# Patient Record
Sex: FEMALE | Race: BLACK OR AFRICAN AMERICAN | NOT HISPANIC OR LATINO | URBAN - METROPOLITAN AREA
[De-identification: names, ages, dates, MRNs, and addresses within clinical notes are randomized per-mention and may not be internally consistent; named-entity substitution may affect disease eponyms.]

---

## 2021-06-15 ENCOUNTER — INPATIENT (INPATIENT)
Facility: HOSPITAL | Age: 21
LOS: 2 days | Discharge: HOME | End: 2021-06-18
Attending: PSYCHIATRY & NEUROLOGY | Admitting: PSYCHIATRY & NEUROLOGY
Payer: COMMERCIAL

## 2021-06-15 ENCOUNTER — EMERGENCY (EMERGENCY)
Facility: HOSPITAL | Age: 21
LOS: 1 days | End: 2021-06-15
Attending: EMERGENCY MEDICINE
Payer: COMMERCIAL

## 2021-06-15 VITALS
DIASTOLIC BLOOD PRESSURE: 78 MMHG | HEIGHT: 64 IN | RESPIRATION RATE: 16 BRPM | HEART RATE: 67 BPM | WEIGHT: 139.99 LBS | OXYGEN SATURATION: 98 % | TEMPERATURE: 97 F | SYSTOLIC BLOOD PRESSURE: 110 MMHG

## 2021-06-15 VITALS — WEIGHT: 132.06 LBS | HEIGHT: 64 IN | TEMPERATURE: 98 F | RESPIRATION RATE: 19 BRPM

## 2021-06-15 VITALS
HEART RATE: 89 BPM | RESPIRATION RATE: 18 BRPM | SYSTOLIC BLOOD PRESSURE: 106 MMHG | DIASTOLIC BLOOD PRESSURE: 67 MMHG | TEMPERATURE: 98 F | OXYGEN SATURATION: 96 %

## 2021-06-15 DIAGNOSIS — F43.20 ADJUSTMENT DISORDER, UNSPECIFIED: ICD-10-CM

## 2021-06-15 DIAGNOSIS — F32.9 MAJOR DEPRESSIVE DISORDER, SINGLE EPISODE, UNSPECIFIED: ICD-10-CM

## 2021-06-15 LAB
ALBUMIN SERPL ELPH-MCNC: 5 G/DL — SIGNIFICANT CHANGE UP (ref 3.3–5)
ALP SERPL-CCNC: 68 U/L — SIGNIFICANT CHANGE UP (ref 40–120)
ALT FLD-CCNC: 9 U/L — LOW (ref 10–45)
AMPHET UR-MCNC: NEGATIVE — SIGNIFICANT CHANGE UP
ANION GAP SERPL CALC-SCNC: 16 MMOL/L — SIGNIFICANT CHANGE UP (ref 5–17)
APAP SERPL-MCNC: <15 UG/ML — SIGNIFICANT CHANGE UP (ref 10–30)
APPEARANCE UR: CLEAR — SIGNIFICANT CHANGE UP
AST SERPL-CCNC: 13 U/L — SIGNIFICANT CHANGE UP (ref 10–40)
BACTERIA # UR AUTO: ABNORMAL
BARBITURATES UR SCN-MCNC: NEGATIVE — SIGNIFICANT CHANGE UP
BASOPHILS # BLD AUTO: 0.03 K/UL — SIGNIFICANT CHANGE UP (ref 0–0.2)
BASOPHILS NFR BLD AUTO: 0.3 % — SIGNIFICANT CHANGE UP (ref 0–2)
BENZODIAZ UR-MCNC: NEGATIVE — SIGNIFICANT CHANGE UP
BILIRUB SERPL-MCNC: 0.2 MG/DL — SIGNIFICANT CHANGE UP (ref 0.2–1.2)
BILIRUB UR-MCNC: NEGATIVE — SIGNIFICANT CHANGE UP
BUN SERPL-MCNC: 7 MG/DL — SIGNIFICANT CHANGE UP (ref 7–23)
CALCIUM SERPL-MCNC: 10.4 MG/DL — SIGNIFICANT CHANGE UP (ref 8.4–10.5)
CHLORIDE SERPL-SCNC: 99 MMOL/L — SIGNIFICANT CHANGE UP (ref 96–108)
CO2 SERPL-SCNC: 23 MMOL/L — SIGNIFICANT CHANGE UP (ref 22–31)
COCAINE METAB.OTHER UR-MCNC: NEGATIVE — SIGNIFICANT CHANGE UP
COLOR SPEC: SIGNIFICANT CHANGE UP
COVID-19 SPIKE DOMAIN AB INTERP: POSITIVE
COVID-19 SPIKE DOMAIN ANTIBODY RESULT: >250 U/ML — HIGH
CREAT SERPL-MCNC: 0.86 MG/DL — SIGNIFICANT CHANGE UP (ref 0.5–1.3)
DIFF PNL FLD: NEGATIVE — SIGNIFICANT CHANGE UP
EOSINOPHIL # BLD AUTO: 0.04 K/UL — SIGNIFICANT CHANGE UP (ref 0–0.5)
EOSINOPHIL NFR BLD AUTO: 0.4 % — SIGNIFICANT CHANGE UP (ref 0–6)
EPI CELLS # UR: 6 /HPF — HIGH
ETHANOL SERPL-MCNC: SIGNIFICANT CHANGE UP MG/DL (ref 0–10)
GLUCOSE SERPL-MCNC: 109 MG/DL — HIGH (ref 70–99)
GLUCOSE UR QL: NEGATIVE — SIGNIFICANT CHANGE UP
HCG SERPL-ACNC: <2 MIU/ML — SIGNIFICANT CHANGE UP
HCG UR QL: NEGATIVE — SIGNIFICANT CHANGE UP
HCT VFR BLD CALC: 37.3 % — SIGNIFICANT CHANGE UP (ref 34.5–45)
HGB BLD-MCNC: 10.9 G/DL — LOW (ref 11.5–15.5)
HYALINE CASTS # UR AUTO: 4 /LPF — HIGH (ref 0–2)
IMM GRANULOCYTES NFR BLD AUTO: 0.3 % — SIGNIFICANT CHANGE UP (ref 0–1.5)
KETONES UR-MCNC: ABNORMAL
LEUKOCYTE ESTERASE UR-ACNC: ABNORMAL
LYMPHOCYTES # BLD AUTO: 1.69 K/UL — SIGNIFICANT CHANGE UP (ref 1–3.3)
LYMPHOCYTES # BLD AUTO: 18.3 % — SIGNIFICANT CHANGE UP (ref 13–44)
MCHC RBC-ENTMCNC: 23.3 PG — LOW (ref 27–34)
MCHC RBC-ENTMCNC: 29.2 GM/DL — LOW (ref 32–36)
MCV RBC AUTO: 79.7 FL — LOW (ref 80–100)
METHADONE UR-MCNC: NEGATIVE — SIGNIFICANT CHANGE UP
MONOCYTES # BLD AUTO: 0.9 K/UL — SIGNIFICANT CHANGE UP (ref 0–0.9)
MONOCYTES NFR BLD AUTO: 9.8 % — SIGNIFICANT CHANGE UP (ref 2–14)
NEUTROPHILS # BLD AUTO: 6.53 K/UL — SIGNIFICANT CHANGE UP (ref 1.8–7.4)
NEUTROPHILS NFR BLD AUTO: 70.9 % — SIGNIFICANT CHANGE UP (ref 43–77)
NITRITE UR-MCNC: NEGATIVE — SIGNIFICANT CHANGE UP
NRBC # BLD: 0 /100 WBCS — SIGNIFICANT CHANGE UP (ref 0–0)
OPIATES UR-MCNC: NEGATIVE — SIGNIFICANT CHANGE UP
OXYCODONE UR-MCNC: NEGATIVE — SIGNIFICANT CHANGE UP
PCP SPEC-MCNC: SIGNIFICANT CHANGE UP
PCP UR-MCNC: NEGATIVE — SIGNIFICANT CHANGE UP
PH UR: 6 — SIGNIFICANT CHANGE UP (ref 5–8)
PLATELET # BLD AUTO: 366 K/UL — SIGNIFICANT CHANGE UP (ref 150–400)
POTASSIUM SERPL-MCNC: 4 MMOL/L — SIGNIFICANT CHANGE UP (ref 3.5–5.3)
POTASSIUM SERPL-SCNC: 4 MMOL/L — SIGNIFICANT CHANGE UP (ref 3.5–5.3)
PROT SERPL-MCNC: 8.3 G/DL — SIGNIFICANT CHANGE UP (ref 6–8.3)
PROT UR-MCNC: ABNORMAL
RBC # BLD: 4.68 M/UL — SIGNIFICANT CHANGE UP (ref 3.8–5.2)
RBC # FLD: 16.1 % — HIGH (ref 10.3–14.5)
RBC CASTS # UR COMP ASSIST: 3 /HPF — SIGNIFICANT CHANGE UP (ref 0–4)
SALICYLATES SERPL-MCNC: <2 MG/DL — LOW (ref 15–30)
SARS-COV-2 IGG+IGM SERPL QL IA: >250 U/ML — HIGH
SARS-COV-2 IGG+IGM SERPL QL IA: POSITIVE
SARS-COV-2 RNA SPEC QL NAA+PROBE: SIGNIFICANT CHANGE UP
SODIUM SERPL-SCNC: 138 MMOL/L — SIGNIFICANT CHANGE UP (ref 135–145)
SP GR SPEC: 1.02 — SIGNIFICANT CHANGE UP (ref 1.01–1.02)
THC UR QL: NEGATIVE — SIGNIFICANT CHANGE UP
TSH SERPL-MCNC: 2.05 UIU/ML — SIGNIFICANT CHANGE UP (ref 0.27–4.2)
UROBILINOGEN FLD QL: NEGATIVE — SIGNIFICANT CHANGE UP
WBC # BLD: 9.22 K/UL — SIGNIFICANT CHANGE UP (ref 3.8–10.5)
WBC # FLD AUTO: 9.22 K/UL — SIGNIFICANT CHANGE UP (ref 3.8–10.5)
WBC UR QL: 11 /HPF — HIGH (ref 0–5)

## 2021-06-15 PROCEDURE — 81001 URINALYSIS AUTO W/SCOPE: CPT

## 2021-06-15 PROCEDURE — 80053 COMPREHEN METABOLIC PANEL: CPT

## 2021-06-15 PROCEDURE — 99284 EMERGENCY DEPT VISIT MOD MDM: CPT

## 2021-06-15 PROCEDURE — 85025 COMPLETE CBC W/AUTO DIFF WBC: CPT

## 2021-06-15 PROCEDURE — 99285 EMERGENCY DEPT VISIT HI MDM: CPT

## 2021-06-15 PROCEDURE — 81025 URINE PREGNANCY TEST: CPT

## 2021-06-15 PROCEDURE — 90792 PSYCH DIAG EVAL W/MED SRVCS: CPT | Mod: 95

## 2021-06-15 PROCEDURE — 84702 CHORIONIC GONADOTROPIN TEST: CPT

## 2021-06-15 PROCEDURE — 93005 ELECTROCARDIOGRAM TRACING: CPT

## 2021-06-15 PROCEDURE — 80307 DRUG TEST PRSMV CHEM ANLYZR: CPT

## 2021-06-15 PROCEDURE — 86769 SARS-COV-2 COVID-19 ANTIBODY: CPT

## 2021-06-15 PROCEDURE — 93010 ELECTROCARDIOGRAM REPORT: CPT

## 2021-06-15 PROCEDURE — 87635 SARS-COV-2 COVID-19 AMP PRB: CPT

## 2021-06-15 PROCEDURE — 99221 1ST HOSP IP/OBS SF/LOW 40: CPT

## 2021-06-15 PROCEDURE — 84443 ASSAY THYROID STIM HORMONE: CPT

## 2021-06-15 RX ORDER — EPINEPHRINE 0.3 MG/.3ML
0.3 INJECTION INTRAMUSCULAR; SUBCUTANEOUS ONCE
Refills: 0 | Status: DISCONTINUED | OUTPATIENT
Start: 2021-06-15 | End: 2021-06-18

## 2021-06-15 NOTE — BH INPATIENT PSYCHIATRY ASSESSMENT NOTE - HPI (INCLUDE ILLNESS QUALITY, SEVERITY, DURATION, TIMING, CONTEXT, MODIFYING FACTORS, ASSOCIATED SIGNS AND SYMPTOMS)
20F. living with family, recently graduated from [employer], no known medical conditions, no known prior psychiatric history, no prior suicide attempts or hospitalizations, now BIBEMS after she attempted to kill herself by ingesting a handful of OTC melatonin pills at 5pm 6/14 in an attempt to kill herself after finding out that her boyfriend cheated.   20F. living with family, recently graduated from [employer], no known medical conditions, no known prior psychiatric history, social ETOH with some recent  increase in use, no withdrawal, last use several days ago, no prior suicide attempts or hospitalizations, now BIBEMS after she attempted to kill herself by ingesting a handful of OTC melatonin pills at 5pm 6/14 in an attempt to kill herself after finding out about an infidelity in a relationship, presented to [facility], was seen by medical provider who cleared patient, also by telepsychiatry, pt continued to endorse  suicidal ideations, was admitted on 9.27.

## 2021-06-15 NOTE — ED ADULT NURSE REASSESSMENT NOTE - NS ED NURSE REASSESS COMMENT FT1
Pt has been sleeping at intervals, calm and controlled, refused breakfast, ambulated to bathroom, CO in place, VSS; collaboration ongoing with  for transfer to [facility]; continue to monitor.

## 2021-06-15 NOTE — BH INPATIENT PSYCHIATRY ASSESSMENT NOTE - NSBHASSESSSUMMFT_PSY_ALL_CORE
20 year-old with no past psychiatric history with what sounds like impulsive ingestion without clear suicidal intent, took melatonin, expected to "sleep" rather than die.   Will routine checks  Defer meds to inpatient team  No chronic medical issues

## 2021-06-15 NOTE — ED PROVIDER NOTE - PROGRESS NOTE DETAILS
[place]: psych consulted, patient remains awake and alert Attending note (Luis Antonio): per d/w tele psychiatry attending, recommending 2 PC for involuntary psychiatric admission for further evaluation and stabilization.  2PC paperwork filled out with Dr Mak (at time of sign-out).  Patient remains calm, cooperative and resting.  Awaiting bed assignment. received sign out from Dr Salmon pending psych placement. briefly, young female with no sig pmh now a SI and concern for safety needing 2PC. signed forms. pt is sleeping comfortably now. DJ Abimael Parson, PGY 3: Received sign out on patient. pending psych day team eval. for beds Abimale Parson, PGY 3: patient is medically cleared Abimael Parson, PGY 3:

## 2021-06-15 NOTE — BH PATIENT PROFILE - HOME MEDICATIONS
Adrenaclick Two-Pack 0.3 mg injectable kit , 0.3 milligram(s) injectable prn for anaphylaxis/allergic reaction  Benadryl ,  orally

## 2021-06-15 NOTE — ED ADULT NURSE REASSESSMENT NOTE - NS ED NURSE REASSESS COMMENT FT1
As per Telepsych reassessment, pt has the mental capacity to sign out AMA. pt. was told earlier that she needs to be admitted to medicine for management of her anemia, but she refused to be treated for it and she signed her dischage papers w/ AMA.

## 2021-06-15 NOTE — ED BEHAVIORAL HEALTH ASSESSMENT NOTE - RISK ASSESSMENT
risk factors include recent suicide attempt, irritability, recent humiliation, stress from work, lack of outpatient treatment. Protective factors include lack of psychosis High Acute Suicide Risk

## 2021-06-15 NOTE — ED BEHAVIORAL HEALTH ASSESSMENT NOTE - SUMMARY
20F. living with family, recently graduated from [employer], no known medical conditions, no known prior psychiatric history, no prior suicide attempts or hospitalizations, now BIBEMS after she attempted to kill herself by ingesting a handful of OTC melatonin pills at 5pm 6/14 in an attempt to kill herself after finding out that her boyfriend cheated.    While patient is currently irritable and not wanting to engage much in interview, she remains at high risk of suicide given recent attempt and ongoing suicidality, with poor insight and judgment requesting discharge. As such she will require involuntary hospitalization for safety, stabilization, and possible medication titration.    COVID Exposure Screen- Patient  1.	*Have you had a COVID-19 test in the last 90 days?  (  ) Yes   (  X) No   (  ) Unknown- Reason: _____  IF YES PROCEED TO QUESTION #2. IF NO OR UNKNOWN, PLEASE SKIP TO QUESTION #3.  2.	Date of test(s) and result(s): ________  3.	*Have you tested positive for COVID-19 antibodies? (  ) Yes   (  X) No   (  ) Unknown- Reason: _____  IF YES PROCEED TO QUESTION #4. IF NO or UNKNOWN, PLEASE SKIP TO QUESTION #5.  4.	Date of positive antibody test: ________  5.	*Have you received 2 doses of the COVID-19 vaccine? ( X ) Yes   (  ) No   (  ) Unknown- Reason: _____   IF YES PROCEED TO QUESTION #6. IF NO or UNKNOWN, PLEASE SKIP TO QUESTION #7.  6.	Date of second dose: __moderna second dose May 12  7.	*In the past 10 days, have you been around anyone with a positive COVID-19 test?* (  ) Yes   (X  ) No   (  ) Unknown- Reason: ____  IF YES PROCEED TO QUESTION #8. IF NO or UNKNOWN, PLEASE SKIP TO QUESTION #13.  8.	Were you within 6 feet of them for at least 15 minutes? (  ) Yes   (  ) No   (  ) Unknown- Reason: _____  9.	Have you provided care for them? (  ) Yes   (  ) No   (  ) Unknown- Reason: ______  10.	Have you had direct physical contact with them (touched, hugged, or kissed them)? (  ) Yes   (  ) No    (  ) Unknown- Reason: _____  11.	Have you shared eating or drinking utensils with them? (  ) Yes   (  ) No    (  ) Unknown- Reason: ____  12.	Have they sneezed, coughed, or somehow gotten respiratory droplets on you? (  ) Yes   (  ) No    (  ) Unknown- Reason: ______  13.	*Have you been out of [place] within the past 10 days?* (  ) Yes   ( X ) No   (  ) Unknown- Reason: _____  IF YES PLEASE ANSWER THE FOLLOWING QUESTIONS:  14.	Which state/country have you been to? ______  15.	Were you there over 24 hours? (  ) Yes   (  ) No    (  ) Unknown- Reason: ______  16.	Date of return to [facility]: ______

## 2021-06-15 NOTE — ED BEHAVIORAL HEALTH ASSESSMENT NOTE - HPI (INCLUDE ILLNESS QUALITY, SEVERITY, DURATION, TIMING, CONTEXT, MODIFYING FACTORS, ASSOCIATED SIGNS AND SYMPTOMS)
20F. living with family, recently graduated from [employer], no known medical conditions, no known prior psychiatric history, no prior suicide attempts or hospitalizations, now BIBEMS after she attempted to kill herself by ingesting a handful of OTC melatonin pills at 5pm 6/14 in an attempt to kill herself after finding out that her boyfriend cheated.    History limited by patient effort as she stated that she did not want to talk about what had happened, just wanted to go home. However she stated that she tried to kill herself overnight by overdose, and still wants to kill herself. She denied that she takes any medications, denies allergies to any medications, did not answer questions related to etoh and cannabis use or other questions about psychosis or xavi.      Per ED chart, this happened in the context of finding out her boyfriend cheated on her this past sunday. 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

## 2021-06-15 NOTE — ED BEHAVIORAL HEALTH NOTE - BEHAVIORAL HEALTH NOTE
===================  PRE-HOSPITAL COURSE  ===================  SOURCE: Chart    DETAILS: Patient arrived via EMS after suicide attempt by overdose on melatonin    ============  ED COURSE   ============  SOURCE: Chart, ED    ARRIVAL: Patient arrived via EMS    BELONGINGS: No items of note    BEHAVIOR: Patient arrived to the ED alert and oriented x3, patient was cooperative with ED medical and safety protocols. Patient reported depressed mood due to recent break-up, states attempted suicide prior to arrival by overdose on melatonin and still having SI, no HI, not overtly psychotic/manic, thought process/speech WNL, patient remained in good behavioral control prior to assessment.     TREATMENT: No PRN psychiatric medication prior to assessment     VISITORS: None     ========================  COLLATERAL  ========================    Patient didn’t provide collateral at this time    COVID Exposure Screen- collateral (i.e. third-party, chart review, belongings, etc; include EMS and ED staff)  1.	*Has the patient had a COVID-19 test in the last 90 days?  (  ) Yes   (  ) No   ( x ) Unknown- Reason: _____  IF YES PROCEED TO QUESTION #2. IF NO OR UNKNOWN, PLEASE SKIP TO QUESTION #3.  2.	Date of test(s) and result(s): ________  3.	*Has the patient tested positive for COVID-19 antibodies? (  ) Yes   (  ) No   ( x ) Unknown- Reason: _____  IF YES PROCEED TO QUESTION #4. IF NO or UNKNOWN, PLEASE SKIP TO QUESTION #5.  4.	Date of positive antibody test: ________  5.	*Has the patient received 2 doses of the COVID-19 vaccine? (  ) Yes   (  ) No   ( x ) Unknown- Reason: _____  IF YES PROCEED TO QUESTION #6. IF NO or UNKNOWN, PLEASE SKIP TO QUESTION #7.  6.	 Date of second dose: ________  7.	*In the past 10 days, has the patient been around anyone with a positive COVID-19 test?* (  ) Yes   (  ) No   ( x ) Unknown- Reason: __  IF YES PROCEED TO QUESTION #8. IF NO or UNKNOWN, PLEASE SKIP TO QUESTION #13.  8.	Was the patient within 6 feet of them for at least 15 minutes? (  ) Yes   (  ) No   (  ) Unknown- Reason: _____  9.	Did the patient provide care for them? (  ) Yes   (  ) No   (  ) Unknown- Reason: ______  10.	Did the patient have direct physical contact with them (touched, hugged, or kissed them)? (  ) Yes   (  ) No    (  ) Unknown- Reason: __  11.	Did the patient share eating or drinking utensils with them? (  ) Yes   (  ) No    (  ) Unknown- Reason: ____  12.	Did they sneeze, cough, or somehow get respiratory droplets on the patient? (  ) Yes   (  ) No    (  ) Unknown- Reason: ______  13.	*Has the patient been out of [place] within the past 10 days?* (  ) Yes   (  ) No   ( x ) Unknown- Reason: _____  IF YES PLEASE ANSWER THE FOLLOWING QUESTIONS:  14.	Which state/country did they go to? ______  15.	Were they there over 24 hours? (  ) Yes   (  ) No    (  ) Unknown- Reason: ______  16.	Date of return to [facility]: ______

## 2021-06-15 NOTE — CHART NOTE - NSCHARTNOTEFT_GEN_A_CORE
ED SW: Chart reviewed for psychiatric transfer. Patient presented via EMS after SA via overdose on melatonin. Patient medically cleared for psychiatric evaluation. Patient evaluated by Telepsych and recommended for inpatient psych hospitalization pending bed availability. 2PC placed in chart.    LCSW contacted [facility] for bed availability. [facility] for medical clearance requested and completed. EKG and 2PC faxed to [facility] as part of review process. Patient accepted and assigned to [address] with Dr. Kristopher Ruiz. MD informed and completed transfer disposition. ROSARIO RN informed of all transfer details. ROSARIO RN to provide handoff to [address] and will contact [facility] to arrange BLS transport. Transfer packet completed to travel with patient.

## 2021-06-15 NOTE — ED PROVIDER NOTE - ATTENDING CONTRIBUTION TO CARE
Attending note (Luis Antonio): 21y/o f presenting s/p reported overdose with "handful" of melatonin tablets; appears depressed; concern for persistent SI following attempt; though unlikely amount and type of medication (melatonin) will create any significant toxic affect; not displaying any evidence of toxidrome, and is otherwise not in acute distress (though depressed affect).  ECG shows borderline tachycardia (102) with normal intervals. will obtain screening labs: cbc (to evaluate for leukocytosis or anemia), CMP (to evaluate for electrolyte abnormalities or renal/liver dysfunction), hcg, serum/urine tox screening (to check for co-ingestion).  Pending initial medical evaluation, will then consult psychiatry service.

## 2021-06-15 NOTE — ED BEHAVIORAL HEALTH ASSESSMENT NOTE - DETAILS
as above Would not impact clinical decisionmaking at this time Discussed with ED attending no contact information availabl

## 2021-06-15 NOTE — ED ADULT NURSE REASSESSMENT NOTE - NS ED NURSE REASSESS COMMENT FT1
Pt still awaiting inpatient transfer, CO in place, VSS; woke up and was somewhat more verbal but still expressing frustration with being in hospital, she responded very well to empathetic support and writer providing her with reading materials and word games since she had expressed boredom; continue to monitor.

## 2021-06-15 NOTE — ED PROVIDER NOTE - NS ED ROS FT
Review of Systems:  -General: no fever or chills  -ENT: no congestion, no difficulty swallowing  -Pulmonary: no cough, no shortness of breath  -Cardiac: no chest pain, no palpitations  -Gastrointestinal: no abdominal pain, no nausea, no vomiting, and no diarrhea.  -Genitourinary: no blood or pain with urination  -Musculoskeletal: no back or neck pain  -Skin: no rashes  -Endocrine: No h/o diabetes or thyroid disease  -Neurologic: No focal weakness or numbness  -Psych: +depression, +SI    All else negative unless otherwise specified elsewhere in this note.

## 2021-06-15 NOTE — ED ADULT NURSE NOTE - OBJECTIVE STATEMENT
19 y/o female bib ems s/p ingestion of unknown amount of melatonin since her boyfriend broke-up w/ her, pt. 21 y/o female bib ems s/p ingestion of unknown amount of melatonin since her boyfriend broke-up w/ her, pt. took melatonin to sleep forever and forget what really happened between her and her boyfriend. pt. denies any prior suicide attempts, no hx of inpt tx, not on any anti-depressants or psychotropics. denies any a/v hallucinations or delusions of any kind. pt. is cooperative w/ ED and security protocol. placed on 1:1 CO for s/i.

## 2021-06-15 NOTE — ED ADULT NURSE REASSESSMENT NOTE - NS ED NURSE REASSESS COMMENT FT1
pt. was made aware of the plan for admission to a psych facility as per Telepsych since she a high risk for suicide. 1:1 CO for s/i maintained.
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
## 2021-06-15 NOTE — ED PROVIDER NOTE - PHYSICAL EXAMINATION
On Physical Exam:  General: awake/alert, speaking clearly in full sentences and without difficulty; cooperative with exam  HEENT: PERRL, MMM, airway patent  Neck: no neck tenderness, no nuchal rigidity  Cardiac: normal s1, s2; RRR; no MGR  Lungs: CTABL  Abdomen: soft nontender/nondistended  : no bladder tenderness or distension  Skin: intact, no rash  Extremities: no peripheral edema, no gross deformities  Neuro: no gross neurologic deficits; no facial asymmetry, moving all extremities, normal gait, no rigidity/clonus, no tremors (resting or intention)  Psych: flat affect, depressed appearing; endorsing continued desire to end her life

## 2021-06-16 LAB
APPEARANCE UR: CLEAR — SIGNIFICANT CHANGE UP
BILIRUB UR-MCNC: NEGATIVE — SIGNIFICANT CHANGE UP
COLOR SPEC: YELLOW — SIGNIFICANT CHANGE UP
COVID-19 SPIKE DOMAIN AB INTERP: POSITIVE
COVID-19 SPIKE DOMAIN ANTIBODY RESULT: >250 U/ML — HIGH
DIFF PNL FLD: NEGATIVE — SIGNIFICANT CHANGE UP
GLUCOSE UR QL: NEGATIVE — SIGNIFICANT CHANGE UP
HCG UR QL: NEGATIVE — SIGNIFICANT CHANGE UP
KETONES UR-MCNC: NEGATIVE — SIGNIFICANT CHANGE UP
LEUKOCYTE ESTERASE UR-ACNC: ABNORMAL
NITRITE UR-MCNC: NEGATIVE — SIGNIFICANT CHANGE UP
PCP SPEC-MCNC: SIGNIFICANT CHANGE UP
PH UR: 6 — SIGNIFICANT CHANGE UP (ref 5–8)
PROT UR-MCNC: ABNORMAL
SARS-COV-2 IGG+IGM SERPL QL IA: >250 U/ML — HIGH
SARS-COV-2 IGG+IGM SERPL QL IA: POSITIVE
SP GR SPEC: 1.03 — HIGH (ref 1.01–1.02)
UROBILINOGEN FLD QL: SIGNIFICANT CHANGE UP

## 2021-06-16 PROCEDURE — 99232 SBSQ HOSP IP/OBS MODERATE 35: CPT

## 2021-06-16 PROCEDURE — 90832 PSYTX W PT 30 MINUTES: CPT

## 2021-06-16 RX ORDER — TRAZODONE HCL 50 MG
50 TABLET ORAL AT BEDTIME
Refills: 0 | Status: DISCONTINUED | OUTPATIENT
Start: 2021-06-16 | End: 2021-06-18

## 2021-06-16 RX ORDER — SERTRALINE 25 MG/1
25 TABLET, FILM COATED ORAL DAILY
Refills: 0 | Status: DISCONTINUED | OUTPATIENT
Start: 2021-06-17 | End: 2021-06-17

## 2021-06-16 RX ORDER — DIPHENHYDRAMINE HCL 50 MG
50 CAPSULE ORAL ONCE
Refills: 0 | Status: COMPLETED | OUTPATIENT
Start: 2021-06-16 | End: 2021-06-16

## 2021-06-16 RX ORDER — SERTRALINE 25 MG/1
25 TABLET, FILM COATED ORAL ONCE
Refills: 0 | Status: COMPLETED | OUTPATIENT
Start: 2021-06-16 | End: 2021-06-16

## 2021-06-16 RX ADMIN — SERTRALINE 25 MILLIGRAM(S): 25 TABLET, FILM COATED ORAL at 12:58

## 2021-06-16 RX ADMIN — Medication 50 MILLIGRAM(S): at 21:01

## 2021-06-16 RX ADMIN — Medication 50 MILLIGRAM(S): at 01:39

## 2021-06-16 NOTE — PSYCHIATRIC REHAB INITIAL EVALUATION - NSBHEDULEVEL_PSY_ALL_CORE
Pt recently graduated from [employer]" with a degree in communication disorders./Bachelor's Degree

## 2021-06-16 NOTE — PSYCHIATRIC REHAB INITIAL EVALUATION - NSBHALCSUBCHOICE_PSY_ALL_CORE
Past social use of marijuana. Pt reports social use of alcohol, however over last few months has been drinking increased amounts of alcohol due to stress of relationship and school. Pt reports drinking a large cup of mixed drink every couple days.

## 2021-06-16 NOTE — BH SOCIAL WORK INITIAL PSYCHOSOCIAL EVALUATION - NSCMSPTSTRENGTHS_PSY_ALL_CORE
Expressive of emotions/Future/goal oriented/Highly motivated for treatment/Intact employment/Intelligence/Interpersonal skills/Physically healthy/Positive attitude/Successful coping history

## 2021-06-16 NOTE — BH TREATMENT PLAN - NSCMSPTSTRENGTHS_PSY_ALL_CORE
Expressive of emotions/Financial stability/Intact employment/Intelligence/Interpersonal skills/Physically healthy/Strong support system

## 2021-06-16 NOTE — PSYCHIATRIC REHAB INITIAL EVALUATION - NSBHPRRECOMMEND_PSY_ALL_CORE
Writer, NP, psychologist and SW met with pt for initial assessment, oriented pt to daily unit activities, DBT group programming and psychiatric rehabilitation programming. Writer provided pt with a welcome packet and was encouraged to attend groups. In response to COVID-19, unit programming will be re-evaluated on a consistent basis in effort to maintain safety guidelines. Pt was polite, verbal and cooperative during initial assessment. Pt was forthcoming with personal data and information surrounding admitting circumstances. Pt was admitted after OD on melatonin after finding out infidelity with ex-boyfriend. Pt denies this OD as a SA but rather wanted to sleep, however acknowledges there may have been an underlying intent to harm herself. Pt reported since discovering this information endorsing worsening depression and anxiety leading her to reach out to suicide hotline. Hotline provided her with outpatient resources, however each place she contacted had no availability. Pt was able to build rapport with writer and able to identify psychiatric rehabilitation goal to address during current hospitalization. Psychiatric rehabilitation staff will continue to engage patient daily in order to develop therapeutic rapport. Pt’s insight and judgement are fair. Pt denies SI, HI, AH and VH.
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
## 2021-06-17 LAB
A1C WITH ESTIMATED AVERAGE GLUCOSE RESULT: 5.1 % — SIGNIFICANT CHANGE UP (ref 4–5.6)
ALBUMIN SERPL ELPH-MCNC: 4.5 G/DL — SIGNIFICANT CHANGE UP (ref 3.3–5)
ALP SERPL-CCNC: 64 U/L — SIGNIFICANT CHANGE UP (ref 40–120)
ALT FLD-CCNC: 9 U/L — SIGNIFICANT CHANGE UP (ref 4–33)
ANION GAP SERPL CALC-SCNC: 15 MMOL/L — HIGH (ref 7–14)
AST SERPL-CCNC: 17 U/L — SIGNIFICANT CHANGE UP (ref 4–32)
BILIRUB SERPL-MCNC: 0.2 MG/DL — SIGNIFICANT CHANGE UP (ref 0.2–1.2)
BUN SERPL-MCNC: 9 MG/DL — SIGNIFICANT CHANGE UP (ref 7–23)
CALCIUM SERPL-MCNC: 9.8 MG/DL — SIGNIFICANT CHANGE UP (ref 8.4–10.5)
CHLORIDE SERPL-SCNC: 100 MMOL/L — SIGNIFICANT CHANGE UP (ref 98–107)
CHOLEST SERPL-MCNC: 137 MG/DL — SIGNIFICANT CHANGE UP
CO2 SERPL-SCNC: 25 MMOL/L — SIGNIFICANT CHANGE UP (ref 22–31)
CREAT SERPL-MCNC: 0.8 MG/DL — SIGNIFICANT CHANGE UP (ref 0.5–1.3)
ESTIMATED AVERAGE GLUCOSE: 100 MG/DL — SIGNIFICANT CHANGE UP (ref 68–114)
GLUCOSE SERPL-MCNC: 71 MG/DL — SIGNIFICANT CHANGE UP (ref 70–99)
HCT VFR BLD CALC: 36.4 % — SIGNIFICANT CHANGE UP (ref 34.5–45)
HDLC SERPL-MCNC: 43 MG/DL — LOW
HGB BLD-MCNC: 10.5 G/DL — LOW (ref 11.5–15.5)
LIPID PNL WITH DIRECT LDL SERPL: 81 MG/DL — SIGNIFICANT CHANGE UP
MCHC RBC-ENTMCNC: 23.2 PG — LOW (ref 27–34)
MCHC RBC-ENTMCNC: 28.8 GM/DL — LOW (ref 32–36)
MCV RBC AUTO: 80.4 FL — SIGNIFICANT CHANGE UP (ref 80–100)
NON HDL CHOLESTEROL: 94 MG/DL — SIGNIFICANT CHANGE UP
NRBC # BLD: 0 /100 WBCS — SIGNIFICANT CHANGE UP
NRBC # FLD: 0 K/UL — SIGNIFICANT CHANGE UP
PLATELET # BLD AUTO: 351 K/UL — SIGNIFICANT CHANGE UP (ref 150–400)
POTASSIUM SERPL-MCNC: 3.8 MMOL/L — SIGNIFICANT CHANGE UP (ref 3.5–5.3)
POTASSIUM SERPL-SCNC: 3.8 MMOL/L — SIGNIFICANT CHANGE UP (ref 3.5–5.3)
PROT SERPL-MCNC: 8.3 G/DL — SIGNIFICANT CHANGE UP (ref 6–8.3)
RBC # BLD: 4.53 M/UL — SIGNIFICANT CHANGE UP (ref 3.8–5.2)
RBC # FLD: 16.2 % — HIGH (ref 10.3–14.5)
SODIUM SERPL-SCNC: 140 MMOL/L — SIGNIFICANT CHANGE UP (ref 135–145)
TRIGL SERPL-MCNC: 63 MG/DL — SIGNIFICANT CHANGE UP
TSH SERPL-MCNC: 1.3 UIU/ML — SIGNIFICANT CHANGE UP (ref 0.27–4.2)
WBC # BLD: 8.13 K/UL — SIGNIFICANT CHANGE UP (ref 3.8–10.5)
WBC # FLD AUTO: 8.13 K/UL — SIGNIFICANT CHANGE UP (ref 3.8–10.5)

## 2021-06-17 PROCEDURE — 99232 SBSQ HOSP IP/OBS MODERATE 35: CPT

## 2021-06-17 RX ORDER — SERTRALINE 25 MG/1
50 TABLET, FILM COATED ORAL DAILY
Refills: 0 | Status: DISCONTINUED | OUTPATIENT
Start: 2021-06-18 | End: 2021-06-18

## 2021-06-17 RX ADMIN — Medication 50 MILLIGRAM(S): at 21:00

## 2021-06-17 RX ADMIN — SERTRALINE 25 MILLIGRAM(S): 25 TABLET, FILM COATED ORAL at 09:03

## 2021-06-17 NOTE — BH PSYCHOLOGY - GROUP THERAPY NOTE - NSBHPSYCHOLPARTICIPCOMMENT_PSY_A_CORE FT
Pt attended the DBT skills acquisition group which takes place daily and is invite only. Group began with introductions, emotions check in, and a mindfulness exercise. Today's skill focused on the mindfulness module and introduced practicing loving kindness to increase love and compassion skill. Group members engaged in discussion regarding the skill and spoke about the challenges of engaging in self-love towards themselves. The  led group members through a loving kindness meditation exercise where group members then shared their feedback. Group ended by having group members identify one act of kindness they can engage in throughout the day and encouraging group members to practice the skill outside of group.    Pt arrived 35 minutes late to group. She engaged in discussion with the skill and shared that prior to being hospitalized she would use prayer to help her, and felt more loved. She spoke how she struggles to love herself, however has many friends who she feels love her. She was engaged and cooperative and was receptive to support from .

## 2021-06-17 NOTE — BH INPATIENT PSYCHIATRY PROGRESS NOTE - NSBHFUPINTERVALHXFT_PSY_A_CORE
Patient seen for follow up for depression, chart reviewed, and case discussed with Dr. Ruiz and in tx team meeting with interdisciplinary staff.. Patient reports improved sleep and met with dietitian today re: food choices.  She reports current depressed mood is somewhat improved.  No significant events reported overnight. Rx adherent, no SE noted or reported. Denies any SI or HI.  Reports will tell staff if she has any thoughts of harming self or others.  EKG is on chart.

## 2021-06-17 NOTE — DIETITIAN INITIAL EVALUATION ADULT. - OTHER INFO
Patient admitted to [facility] d/t . Patient states she is not used to the foods served in the hospital. Food preferences obtained and implemented. Reports she has been eating more salads and fruits lately. States she may have lost some weight from eating healthier. Denies any GI distress. Confirmed allergy to peanuts. Labs: Hgb low- discussed with patient foods rich in iron to include in diet.

## 2021-06-18 VITALS — TEMPERATURE: 98 F | RESPIRATION RATE: 18 BRPM

## 2021-06-18 PROCEDURE — 99238 HOSP IP/OBS DSCHRG MGMT 30/<: CPT | Mod: 25

## 2021-06-18 PROCEDURE — 90837 PSYTX W PT 60 MINUTES: CPT | Mod: 59

## 2021-06-18 PROCEDURE — 90853 GROUP PSYCHOTHERAPY: CPT

## 2021-06-18 RX ORDER — SERTRALINE 25 MG/1
1 TABLET, FILM COATED ORAL
Qty: 30 | Refills: 0
Start: 2021-06-18 | End: 2021-07-17

## 2021-06-18 RX ORDER — TRAZODONE HCL 50 MG
1 TABLET ORAL
Qty: 30 | Refills: 0
Start: 2021-06-18 | End: 2021-07-17

## 2021-06-18 RX ADMIN — SERTRALINE 50 MILLIGRAM(S): 25 TABLET, FILM COATED ORAL at 08:42

## 2021-06-18 NOTE — BH INPATIENT PSYCHIATRY DISCHARGE NOTE - NSBHSUICIDESTATUS_PSY_ALL_CORE
Denies current SI or HI    Reports OD was not a suicide attempt as she did not think the melatonin would kill her, just thought she would sleep

## 2021-06-18 NOTE — BH INPATIENT PSYCHIATRY PROGRESS NOTE - NSTXDEPRESINTERMD_PSY_ALL_CORE
Rx management    begin Zoloft and Trazodone
Rx management    Zoloft and Trazodone
Rx management    Zoloft and Trazodone

## 2021-06-18 NOTE — BH INPATIENT PSYCHIATRY PROGRESS NOTE - NSBHASSESSSUMMFT_PSY_ALL_CORE
20 year-old with no past psychiatric history with what sounds like impulsive ingestion without clear suicidal intent, took melatonin, expected to "sleep" rather than die. Reports depressed mood, with hx of depressive sx 6 years ago. Increased depression now due to issues with ex-boyfriend. Reports poor sleep and appetite.     does not need CO at this time, denies SI  begin Zoloft 25mg qam  begin Trazodone 50mg hs for sleep
20 year-old with no past psychiatric history with what sounds like impulsive ingestion without clear suicidal intent, took melatonin, expected to "sleep" rather than die. Reports depressed mood, with hx of depressive sx 6 years ago. Increased depression now due to issues with ex-boyfriend. Reports poor sleep and appetite.     does not need CO at this time, denies SI  begin Zoloft 25mg qam, increase to 50mg  begin Trazodone 50mg hs for sleep     Sleep is improved on Trazodone
20 year-old with no past psychiatric history with what sounds like impulsive ingestion without clear suicidal intent, took melatonin, expected to "sleep" rather than die. Reports depressed mood, with hx of depressive sx 6 years ago. Increased depression now due to issues with ex-boyfriend. Reports poor sleep and appetite.     does not need CO at this time, denies SI  begin Zoloft 25mg qam, increase to 50mg  begin Trazodone 50mg hs for sleep

## 2021-06-18 NOTE — BH INPATIENT PSYCHIATRY PROGRESS NOTE - NSTXSLPPATINTERMD_PSY_ALL_CORE
Rx management   begin hs Trazodone

## 2021-06-18 NOTE — BH INPATIENT PSYCHIATRY DISCHARGE NOTE - NSBHDCHANDOFFFT_PSY_ALL_CORE
called [facility] [phone] and left message with staff for Wilfredo Handley to return my call for sign out called [facility] [phone] and gave sign out to Wilfredo Handley

## 2021-06-18 NOTE — BH PSYCHOLOGY - GROUP THERAPY NOTE - NSPSYCHOLGRPDBTGOAL_PSY_A_CORE
reduce mood and affective lability/other...
reduce mood and affective lability/improve ability to indentify feelings/reduce vulnerability to emotional dysregualation

## 2021-06-18 NOTE — BH INPATIENT PSYCHIATRY PROGRESS NOTE - CURRENT MEDICATION
MEDICATIONS  (STANDING):  sertraline 25 milliGRAM(s) Oral once  traZODone 50 milliGRAM(s) Oral at bedtime    MEDICATIONS  (PRN):  EPINEPHrine     1 mG/mL Injectable 0.3 milliGRAM(s) IntraMuscular once PRN ANAPHYLAXIS  LORazepam     Tablet 1 milliGRAM(s) Oral every 6 hours PRN anxiety, agitation  LORazepam   Injectable 2 milliGRAM(s) IntraMuscular once PRN severe agitation  
MEDICATIONS  (STANDING):  sertraline 25 milliGRAM(s) Oral daily  traZODone 50 milliGRAM(s) Oral at bedtime    MEDICATIONS  (PRN):  EPINEPHrine     1 mG/mL Injectable 0.3 milliGRAM(s) IntraMuscular once PRN ANAPHYLAXIS  LORazepam     Tablet 1 milliGRAM(s) Oral every 6 hours PRN anxiety, agitation  LORazepam   Injectable 2 milliGRAM(s) IntraMuscular once PRN severe agitation  
MEDICATIONS  (STANDING):  sertraline 50 milliGRAM(s) Oral daily  traZODone 50 milliGRAM(s) Oral at bedtime    MEDICATIONS  (PRN):  EPINEPHrine     1 mG/mL Injectable 0.3 milliGRAM(s) IntraMuscular once PRN ANAPHYLAXIS  LORazepam     Tablet 1 milliGRAM(s) Oral every 6 hours PRN anxiety, agitation  LORazepam   Injectable 2 milliGRAM(s) IntraMuscular once PRN severe agitation

## 2021-06-18 NOTE — BH INPATIENT PSYCHIATRY PROGRESS NOTE - NSBHATTESTSEENBY_PSY_A_CORE
NP without Attending Psychiatrist

## 2021-06-18 NOTE — BH PSYCHOLOGY - CLINICIAN PSYCHOTHERAPY NOTE - NSBHPSYCHOLPROBS_PSY_ALL_CORE
Academic/Vocational/Social Dysfunction/Anxiety/Depression/Substance Abuse/Suicidality
Academic/Vocational/Social Dysfunction/Depression/Impulsivity/Substance Abuse/Suicidality
Academic/Vocational/Social Dysfunction/Anxiety/Depression/Suicidality

## 2021-06-18 NOTE — BH DISCHARGE NOTE NURSING/SOCIAL WORK/PSYCH REHAB - NSDCPRRECOMMEND_PSY_ALL_CORE
Psychiatric rehabilitation staff recommends that patient continue to explore and utilize coping strategies for better stress management post-discharge. Patient will benefit from beginning outpatient treatment for medication management, support and individual psychotherapy.

## 2021-06-18 NOTE — BH PSYCHOLOGY - GROUP THERAPY NOTE - NSPSYCHOLGRPGENPT_PSY_A_CORE FT
Today’s open group topic covered positive affirmations: what they are, how they work, and some sample affirmations. The group began with a general check-in, then group members shared about any previous experiences they had using affirmations. They read the handout, which explains positive affirmations and provides evidence for their effectiveness. Then, they read some sample affirmations and wrote down their own. Some people chose to re-write items from the sample, and some people wrote a personalized list. Group members who felt comfortable offered to read aloud the affirmations they had written. Other group members provided support.      The patient was pleasant and cooperative in group. She read from the handout and shared some of her thoughts about why affirmations can be useful.

## 2021-06-18 NOTE — BH PSYCHOLOGY - GROUP THERAPY NOTE - NSPSYCHOLGRPDBTEMOT_PSY_A_CORE FT
The patient and provider discussed the Emotion Regulation topic, Accumulating Positives in the Short Term. They started with a brief mindfulness exercise and check-in. They then reviewed the reasons for creating pleasant events in order to experience more positive emotions. The patient discussed the challenges to being mindful of positive experiences, such as having worries about others judging her or thinking negative thoughts about her. The patient and therapist then spent some time problem-solving about these fears of being judged. The patient identified activities she would like to do to have positive experiences this week.      The patient participated actively in this group. She gave examples from her personal life of times when she tried to incorporate positive experiences. At first she reported she could not think of positive activities to do on the unit, but the group problem-solved, and she said she looks forward to her family visiting. 
n/a

## 2021-06-18 NOTE — BH DISCHARGE NOTE NURSING/SOCIAL WORK/PSYCH REHAB - NSCDUDCCRISIS_PSY_A_CORE
[facility] Well  1 [phone]) [facility]-WELL ([phone])  Text "WELL" to 51489  Website: www.[employer].com/.[employer] 1 (666) 471-FFXK (2066) Website: www.safehorizon.org/.National Suicide Prevention Lifeline 5 (760) 094-5998/.  [employer]  1 (247) LIFEN[employer] (376-2976)/.  [facility]  [phone] [place] [address] [phone]   Hours:  Monday through Friday from 9 AM to 3 PM/.  [place] [employer] - Veterans Crisis Line  4 [phone], Option 1

## 2021-06-18 NOTE — BH PSYCHOLOGY - CLINICIAN PSYCHOTHERAPY NOTE - NSTXDEPRESGOAL_PSY_ALL_CORE
Will identify 2 coping skills that assist in improving mood
Attend and participate in at least 2 groups daily despite low mood/energy
Attend and participate in at least 2 groups daily despite low mood/energy

## 2021-06-18 NOTE — BH DISCHARGE NOTE NURSING/SOCIAL WORK/PSYCH REHAB - PATIENT PORTAL LINK FT
You can access the FollowMyHealth Patient Portal offered by [facility] by registering at the following website: [email or web address]. By joining [facility]’s FollowMyHealth portal, you will also be able to view your health information using other applications (apps) compatible with our system.

## 2021-06-18 NOTE — BH INPATIENT PSYCHIATRY PROGRESS NOTE - NSBHMETABOLIC_PSY_ALL_CORE_FT
BMI: BMI (kg/m2): 22.7 (06-15-21 @ 19:00)  HbA1c: A1C with Estimated Average Glucose Result: 5.1 % (06-17-21 @ 09:53)    Glucose:   BP: 142/85 (06-17-21 @ 19:49) (124/68 - 142/85)  Lipid Panel: Date/Time: 06-17-21 @ 09:53  Cholesterol, Serum: 137  Direct LDL: --  HDL Cholesterol, Serum: 43  Total Cholesterol/HDL Ration Measurement: --  Triglycerides, Serum: 63  
BMI: BMI (kg/m2): 22.7 (06-15-21 @ 19:00)  HbA1c:   Glucose:   BP: --  Lipid Panel: 
BMI: BMI (kg/m2): 22.7 (06-15-21 @ 19:00)  HbA1c: A1C with Estimated Average Glucose Result: 5.1 % (06-17-21 @ 09:53)    Glucose:   BP: 124/68 (06-16-21 @ 19:32) (124/68 - 124/68)  Lipid Panel: Date/Time: 06-17-21 @ 09:53  Cholesterol, Serum: 137  Direct LDL: --  HDL Cholesterol, Serum: 43  Total Cholesterol/HDL Ration Measurement: --  Triglycerides, Serum: 63

## 2021-06-18 NOTE — BH PSYCHOLOGY - CLINICIAN PSYCHOTHERAPY NOTE - NSBHPSYCHOLINT_PSY_A_CORE
Dialectical  Behavioral Therapy (DBT)/Dynamic issues addressed/Problem-solving techniques discussed/Supportive therapy/Treatment compliance encouraged
Dialectical  Behavioral Therapy (DBT)/Problem-solving techniques discussed/Supportive therapy/Treatment compliance encouraged
Cognitive/behavioral therapy/Dialectical  Behavioral Therapy (DBT)/Dynamic issues addressed/Problem-solving techniques discussed/Supported coping skills/Supportive therapy/Treatment compliance encouraged

## 2021-06-18 NOTE — BH INPATIENT PSYCHIATRY DISCHARGE NOTE - NSDCMRMEDTOKEN_GEN_ALL_CORE_FT
I advised the patient that the only way to determine if her BOW has ruptured is to come in for testing.  Patient was agreeable but stated she needed to find a ride.  I asked her to let me know when she finds a ride so that we are expecting her.     Adrenaclick Two-Pack 0.3 mg injectable kit: 0.3 milligram(s) injectable prn for anaphylaxis/allergic reaction  sertraline 50 mg oral tablet: 1 tab(s) orally once a day x 30 days  for depression   traZODone 50 mg oral tablet: 1 tab(s) orally once a day (at bedtime) x 30 days  for depression/insomnia

## 2021-06-18 NOTE — BH INPATIENT PSYCHIATRY PROGRESS NOTE - NSBHFUPINTERVALCCFT_PSY_A_CORE
"I am glad to be going home"
"I tried to overdose on Melatonin.  I needed sleep and I have a lot of stress about my ex-boyfriend and the breakup"
"I slept better last night"

## 2021-06-18 NOTE — BH INPATIENT PSYCHIATRY PROGRESS NOTE - PRN MEDS
MEDICATIONS  (PRN):  EPINEPHrine     1 mG/mL Injectable 0.3 milliGRAM(s) IntraMuscular once PRN ANAPHYLAXIS  LORazepam     Tablet 1 milliGRAM(s) Oral every 6 hours PRN anxiety, agitation  LORazepam   Injectable 2 milliGRAM(s) IntraMuscular once PRN severe agitation  

## 2021-06-18 NOTE — BH PSYCHOLOGY - GROUP THERAPY NOTE - NSPSYCHOLGRPDBTPT_PSY_A_CORE
stable mood and affect in group/patient showing good behavior control/no self-destructive impulses/behaviors/Patient able to identify mood states
no self-destructive impulses/behaviors

## 2021-06-18 NOTE — BH INPATIENT PSYCHIATRY PROGRESS NOTE - NSBHCHARTREVIEWVS_PSY_A_CORE FT
Vital Signs Last 24 Hrs  T(C): 36.8 (17 Jun 2021 08:23), Max: 37 (16 Jun 2021 19:32)  T(F): 98.2 (17 Jun 2021 08:23), Max: 98.6 (16 Jun 2021 19:32)  HR: 87 (16 Jun 2021 19:32) (87 - 87)  BP: 124/68 (16 Jun 2021 19:32) (124/68 - 124/68)  BP(mean): --  RR: 18 (17 Jun 2021 08:23) (18 - 18)  SpO2: --
Vital Signs Last 24 Hrs  T(C): 36.6 (18 Jun 2021 08:09), Max: 36.8 (17 Jun 2021 18:26)  T(F): 97.9 (18 Jun 2021 08:09), Max: 98.2 (17 Jun 2021 18:26)  HR: 90 (17 Jun 2021 19:49) (90 - 90)  BP: 142/85 (17 Jun 2021 19:49) (142/85 - 142/85)  BP(mean): --  RR: 18 (18 Jun 2021 08:09) (18 - 18)  SpO2: --
Vital Signs Last 24 Hrs  T(C): 37 (16 Jun 2021 08:51), Max: 37 (16 Jun 2021 08:51)  T(F): 98.6 (16 Jun 2021 08:51), Max: 98.6 (16 Jun 2021 08:51)  HR: --  BP: --  BP(mean): --  RR: 18 (16 Jun 2021 08:51) (18 - 19)  SpO2: --

## 2021-06-18 NOTE — BH INPATIENT PSYCHIATRY PROGRESS NOTE - NSBHMSEKNOWHOW_PSY_ALL_CORE
Educational attainment/Vocabulary

## 2021-06-18 NOTE — BH INPATIENT PSYCHIATRY PROGRESS NOTE - DETAILS
allergic to peanuts    dietary aware, staff on unit aware, dietary consult completed
allergic to peanuts    dietary aware, staff on unit aware, dietary consult completed
allergic to peanuts    dietary aware, staff on unit aware, dietary consult ordered

## 2021-06-18 NOTE — BH DISCHARGE NOTE NURSING/SOCIAL WORK/PSYCH REHAB - DISCHARGE INSTRUCTIONS AFTERCARE APPOINTMENTS
In order to check the location, date, or time of your aftercare appointment, please refer to your Discharge Instructions Document given to you upon leaving the hospital.  If you have lost the instructions please call [phone]

## 2021-06-18 NOTE — BH INPATIENT PSYCHIATRY PROGRESS NOTE - NSTXSLPPATGOAL_PSY_ALL_CORE
Be able to sleep for a minimum of six hours daily

## 2021-06-18 NOTE — BH INPATIENT PSYCHIATRY DISCHARGE NOTE - NSBHDCMEDICALFT_PSY_A_CORE
none except anemia noted on lab work.  Patient advised to follow up with outpatient medical provider

## 2021-06-18 NOTE — BH INPATIENT PSYCHIATRY PROGRESS NOTE - NSBHADMITMEDEDUDETAILS_A_CORE FT
Patient teaching done re: potential benefits/SE of Zoloft with Care Notes given and patient agreeing to take Zoloft
Patient teaching done re: need to take Rx as ordered and follow up with aftercare for sx management and relapse prevention with teach back verbalzied
Patient teaching done re: need to take Rx as ordered, and increase in Zoloft dose with patient agreeing to take Zoloft

## 2021-06-18 NOTE — BH INPATIENT PSYCHIATRY DISCHARGE NOTE - HPI (INCLUDE ILLNESS QUALITY, SEVERITY, DURATION, TIMING, CONTEXT, MODIFYING FACTORS, ASSOCIATED SIGNS AND SYMPTOMS)
20F. living with family, recently graduated from [employer] no known medical conditions, no known prior psychiatric history, social ETOH with some recent  increase in use, no withdrawal, last use several days ago, no prior suicide attempts or hospitalizations, now BIBEMS after she attempted to kill herself by ingesting a handful of OTC melatonin pills at 5pm 6/14 in an attempt to kill herself after finding out about an infidelity in a relationship, presented to [facility], was seen by medical provider who cleared patient, also by telepsychiatry, pt continued to endorse  suicidal ideations, was admitted on 9.27.

## 2021-06-18 NOTE — BH DISCHARGE NOTE NURSING/SOCIAL WORK/PSYCH REHAB - NSDCPRGOAL_PSY_ALL_CORE
Patient was admitted due to worsening depression symptoms and suicidal ideations. Patient has demonstrated good progress toward psychiatric rehabilitation goals over the current hospitalization. Patient has endorsed improvement in overall depressive symptoms, mood/affect, hopefulness, and overall functioning. In response to COVID19, there has been a shift in hospital wide policies and protocols and unit programming. Patient attended approximately two of the psychiatric rehabilitation groups offered during this admission. Patient was engaged, verbal and participatory in therapeutic activities. Patient did require prompting to attend groups or require redirection while on the unit. Patient was visible on the unit and social with select peers appropriately. Patient was verbal, polite and cooperative with staff. Patient’s impulse control has been intact while on the unit. Patient’s insight is fair and improving and judgement has been fair at this time. Patient denied AH/VH and active SI/HI, intent or plan prior to discharge at this time. It should be noted that a psychology treatment team member patient with completion of a safety plan and a copy will be submitted in patient’s chart and another will be given to patient prior to discharge. Patient was provided with a survey to complete prior to discharge.  Patient was admitted due to worsening depression symptoms and suicidal ideations. Patient has demonstrated good progress toward psychiatric rehabilitation goals over the current hospitalization. Patient has endorsed improvement in overall depressive symptoms, mood/affect, hopefulness, and overall functioning. In response to COVID19, there has been a shift in hospital wide policies and protocols and unit programming. Patient attended approximately two of the psychiatric rehabilitation groups offered during this admission. Patient was engaged, verbal and participatory in therapeutic activities. Patient did require prompting to attend groups however, did not require redirection while on the unit. Patient was visible on the unit and social with select peers appropriately. Patient was verbal, polite and cooperative with staff. Patient’s impulse control has been intact while on the unit. Patient’s insight is fair and improving and judgement has been fair at this time. Patient denied AH/VH and active SI/HI, intent or plan prior to discharge at this time. It should be noted that a psychology treatment team member patient with completion of a safety plan and a copy will be submitted in patient’s chart and another will be given to patient prior to discharge. Patient was provided with a survey to complete prior to discharge.

## 2021-06-18 NOTE — BH INPATIENT PSYCHIATRY PROGRESS NOTE - NSBHFUPINTERVALHXFT_PSY_A_CORE
Patient seen for follow up for depression, chart reviewed, and case discussed with Dr. Ruiz and in tx team meeting with interdisciplinary staff. All members of team agree patient is safe and appropriate for discharge. . Patient reports improved sleep but reports does not like the food here. .  She reports mood is improved.  She reports her family and friends have been very supportive of her. She reports she is hopeful about her future, and has plans to return to college. She is able to ID reasons to live, such as her family and friends, and going forward with her life.    No significant events reported overnight. Rx adherent, no SE noted or reported. Denies any SI or HI.  Reports if she has SI at home she will tell a family member or call the Suicide Hotline, or the  Crisis Center and was given phone numbers for both places. She is Rx adherent and no SE are noted or reported. Patient reports she has hx of anemia, and will follow up with her outpatient provider . Patient reports she has received the Covid vaccine already. Patient is stable and is not a danger to self or others at this time.  Discharge patient to home.

## 2021-06-18 NOTE — BH INPATIENT PSYCHIATRY DISCHARGE NOTE - NSBHMETABOLIC_PSY_ALL_CORE_FT
BMI: BMI (kg/m2): 22.7 (06-15-21 @ 19:00)  HbA1c: A1C with Estimated Average Glucose Result: 5.1 % (06-17-21 @ 09:53)    Glucose:   BP: 142/85 (06-17-21 @ 19:49) (124/68 - 142/85)  Lipid Panel: Date/Time: 06-17-21 @ 09:53  Cholesterol, Serum: 137  Direct LDL: --  HDL Cholesterol, Serum: 43  Total Cholesterol/HDL Ration Measurement: --  Triglycerides, Serum: 63

## 2021-06-18 NOTE — BH INPATIENT PSYCHIATRY DISCHARGE NOTE - REASON FOR ADMISSION
Patient overdosed on melatonin, now denies it was a suicide attempt Patient overdosed on melatonin, now denies it was a suicide attempt. Patient reported depressed mood

## 2021-06-18 NOTE — BH INPATIENT PSYCHIATRY DISCHARGE NOTE - HOSPITAL COURSE
The patient has continued to show improvement in symptoms. She states her depression is better and reports no anxiety. She denies any SI, and her behavior has been in good control. She reports sleeping well, but her appetite has not been good because she does not like the food here.  Dietary consult was completed to help with food preferences. She has been fully engaged on the unit, medication compliant, and reports she will follow up with outpatient treatment. She reports her family and friends are supportive, which is a protective factor for her. She is able to safety plan appropriately. The patient's risk cannot be further decreased with further inpatient hospitalization. She is no longer an acute danger to self or others, and is stable for discharge home.

## 2021-06-18 NOTE — BH PSYCHOLOGY - CLINICIAN PSYCHOTHERAPY NOTE - NSBHPSYCHOLGOALS_PSY_A_CORE
Decrease symptoms/Improve level of independent functioning/Improve social/vocational/coping skills/Prevent relapse
Assessment/Improve level of independent functioning/Improve social/vocational/coping skills/Psychoeducation/Treatment compliance
Improve level of independent functioning/Improve social/vocational/coping skills/Prevent relapse

## 2021-06-18 NOTE — BH INPATIENT PSYCHIATRY PROGRESS NOTE - NSBHINPTBILLING_PSY_ALL_CORE
93831 - Inpatient Moderate Complexity
43196 - Hospital Discharge Day Management; 30 min or less
28152 - Inpatient Moderate Complexity
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
## 2022-04-20 NOTE — ED PROVIDER NOTE - NSSERVICENOTAVAIL_ED_A_ED
Psychiatry
Detail Level: Detailed
Quality 226: Preventive Care And Screening: Tobacco Use: Screening And Cessation Intervention: Patient screened for tobacco use and is an ex/non-smoker
Quality 265: Biopsy Follow-Up: Biopsy results reviewed, communicated, tracked, and documented

## 2023-05-18 NOTE — ED BEHAVIORAL HEALTH ASSESSMENT NOTE - NS ED BHA TELEPSYCH PROVIDER LOCATION
[facility] Topical Ketoconazole Counseling: Patient counseled that this medication may cause skin irritation or allergic reactions.  In the event of skin irritation, the patient was advised to reduce the amount of the drug applied or use it less frequently.   The patient verbalized understanding of the proper use and possible adverse effects of ketoconazole.  All of the patient's questions and concerns were addressed.

## 2023-10-11 NOTE — ED BEHAVIORAL HEALTH ASSESSMENT NOTE - NS ED BHA MSE GENERAL APPEARANCE
Prior  birth(s) or uterine surgery/BMI > 40/AMA - over 35 years of age/Aspirin use in pregnancy
No deformities present

## 2024-04-08 NOTE — ED ADULT NURSE NOTE - STREET DRUG/MEDICATION/INHALANT, DURATION OF USE, PROFILE
Proton Pump Inhibitor (PPI) Refill Protocol - 12 Month Protocol Passed  omeprazole (PrilOSEC) 20 MG capsule  4/6/2024  1:31 AM    Seen by prescribing provider or same department within the last 12 months or has a future appt in 3 months - IF FAILED PLEASE LOOK AT CHART REVIEW FOR LAST VISIT AND PROCEED ACCORDINGLY    Not on Clopidogrel (Plavix) or if on, refill is for Pantoprazole (Protonix)    Medication (including dose and sig) on current meds list    SNRI (Selective Serotonin-Norepinephrine Reuptake Inhibitors) Refill Protocol - 12 Month Protocol Passed  venlafaxine XR (EFFEXOR XR) 75 MG 24 hr capsule  4/6/2024  1:31 AM    Seen by prescribing provider or same department within the last 12 months or has a future appt in 3 months - IF FAILED PLEASE LOOK AT CHART REVIEW FOR LAST VISIT AND PROCEED ACCORDINGLY    eGFR greater than 29 within last 12 months looking at last value -- IF CRITERIA FAILED REFER TO PROTOCOL DETAILS    Medication (including dose and sig) on current meds list   smokes marijuana @ least 1-2 /month

## 2024-08-19 ENCOUNTER — EMERGENCY (EMERGENCY)
Facility: HOSPITAL | Age: 24
LOS: 0 days | Discharge: HOME | End: 2024-08-19
Attending: EMERGENCY MEDICINE
Payer: COMMERCIAL

## 2024-08-19 VITALS
SYSTOLIC BLOOD PRESSURE: 123 MMHG | OXYGEN SATURATION: 99 % | TEMPERATURE: 99 F | DIASTOLIC BLOOD PRESSURE: 81 MMHG | HEART RATE: 885 BPM | RESPIRATION RATE: 18 BRPM

## 2024-08-19 VITALS
SYSTOLIC BLOOD PRESSURE: 129 MMHG | TEMPERATURE: 99 F | HEIGHT: 64 IN | DIASTOLIC BLOOD PRESSURE: 79 MMHG | OXYGEN SATURATION: 97 % | HEART RATE: 100 BPM | RESPIRATION RATE: 18 BRPM | WEIGHT: 162.04 LBS
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
## 2024-08-19 DIAGNOSIS — S30.1XXA CONTUSION OF ABDOMINAL WALL, INITIAL ENCOUNTER: ICD-10-CM

## 2024-08-19 DIAGNOSIS — Z91.010 ALLERGY TO PEANUTS: ICD-10-CM

## 2024-08-19 DIAGNOSIS — R10.2 PELVIC AND PERINEAL PAIN: ICD-10-CM

## 2024-08-19 LAB
ALBUMIN SERPL ELPH-MCNC: 3.5 G/DL — SIGNIFICANT CHANGE UP (ref 3.3–5)
ALP SERPL-CCNC: 88 U/L — SIGNIFICANT CHANGE UP (ref 40–120)
ALT FLD-CCNC: 31 U/L — SIGNIFICANT CHANGE UP (ref 12–78)
ANION GAP SERPL CALC-SCNC: 5 MMOL/L — SIGNIFICANT CHANGE UP (ref 5–17)
APPEARANCE UR: CLEAR — SIGNIFICANT CHANGE UP
AST SERPL-CCNC: 17 U/L — SIGNIFICANT CHANGE UP (ref 15–37)
BACTERIA # UR AUTO: ABNORMAL /HPF
BASOPHILS # BLD AUTO: 0.03 K/UL — SIGNIFICANT CHANGE UP (ref 0–0.2)
BASOPHILS NFR BLD AUTO: 0.3 % — SIGNIFICANT CHANGE UP (ref 0–2)
BILIRUB SERPL-MCNC: 0.2 MG/DL — SIGNIFICANT CHANGE UP (ref 0.2–1.2)
BILIRUB UR-MCNC: NEGATIVE — SIGNIFICANT CHANGE UP
BLD GP AB SCN SERPL QL: SIGNIFICANT CHANGE UP
BUN SERPL-MCNC: 18 MG/DL — SIGNIFICANT CHANGE UP (ref 7–23)
CALCIUM SERPL-MCNC: 9 MG/DL — SIGNIFICANT CHANGE UP (ref 8.5–10.1)
CHLORIDE SERPL-SCNC: 108 MMOL/L — SIGNIFICANT CHANGE UP (ref 96–108)
CO2 SERPL-SCNC: 26 MMOL/L — SIGNIFICANT CHANGE UP (ref 22–31)
COLOR SPEC: YELLOW — SIGNIFICANT CHANGE UP
COMMENT - URINE: SIGNIFICANT CHANGE UP
CREAT SERPL-MCNC: 0.91 MG/DL — SIGNIFICANT CHANGE UP (ref 0.5–1.3)
DIFF PNL FLD: NEGATIVE — SIGNIFICANT CHANGE UP
EGFR: 90 ML/MIN/1.73M2 — SIGNIFICANT CHANGE UP
EOSINOPHIL # BLD AUTO: 0.11 K/UL — SIGNIFICANT CHANGE UP (ref 0–0.5)
EOSINOPHIL NFR BLD AUTO: 1.3 % — SIGNIFICANT CHANGE UP (ref 0–6)
EPI CELLS # UR: PRESENT
GLUCOSE SERPL-MCNC: 104 MG/DL — HIGH (ref 70–99)
GLUCOSE UR QL: NEGATIVE MG/DL — SIGNIFICANT CHANGE UP
HCG SERPL-ACNC: <1 MIU/ML — SIGNIFICANT CHANGE UP
HCT VFR BLD CALC: 41.6 % — SIGNIFICANT CHANGE UP (ref 34.5–45)
HGB BLD-MCNC: 13.8 G/DL — SIGNIFICANT CHANGE UP (ref 11.5–15.5)
IMM GRANULOCYTES NFR BLD AUTO: 0.1 % — SIGNIFICANT CHANGE UP (ref 0–0.9)
KETONES UR-MCNC: ABNORMAL MG/DL
LACTATE SERPL-SCNC: 0.6 MMOL/L — LOW (ref 0.7–2)
LEUKOCYTE ESTERASE UR-ACNC: ABNORMAL
LIDOCAIN IGE QN: 29 U/L — SIGNIFICANT CHANGE UP (ref 13–75)
LYMPHOCYTES # BLD AUTO: 2.58 K/UL — SIGNIFICANT CHANGE UP (ref 1–3.3)
LYMPHOCYTES # BLD AUTO: 29.4 % — SIGNIFICANT CHANGE UP (ref 13–44)
MCHC RBC-ENTMCNC: 30.4 PG — SIGNIFICANT CHANGE UP (ref 27–34)
MCHC RBC-ENTMCNC: 33.2 G/DL — SIGNIFICANT CHANGE UP (ref 32–36)
MCV RBC AUTO: 91.6 FL — SIGNIFICANT CHANGE UP (ref 80–100)
MONOCYTES # BLD AUTO: 0.82 K/UL — SIGNIFICANT CHANGE UP (ref 0–0.9)
MONOCYTES NFR BLD AUTO: 9.4 % — SIGNIFICANT CHANGE UP (ref 2–14)
NEUTROPHILS # BLD AUTO: 5.22 K/UL — SIGNIFICANT CHANGE UP (ref 1.8–7.4)
NEUTROPHILS NFR BLD AUTO: 59.5 % — SIGNIFICANT CHANGE UP (ref 43–77)
NITRITE UR-MCNC: NEGATIVE — SIGNIFICANT CHANGE UP
NRBC # BLD: 0 /100 WBCS — SIGNIFICANT CHANGE UP (ref 0–0)
PH UR: 6.5 — SIGNIFICANT CHANGE UP (ref 5–8)
PLATELET # BLD AUTO: 219 K/UL — SIGNIFICANT CHANGE UP (ref 150–400)
POTASSIUM SERPL-MCNC: 4 MMOL/L — SIGNIFICANT CHANGE UP (ref 3.5–5.3)
POTASSIUM SERPL-SCNC: 4 MMOL/L — SIGNIFICANT CHANGE UP (ref 3.5–5.3)
PROT SERPL-MCNC: 7.8 GM/DL — SIGNIFICANT CHANGE UP (ref 6–8.3)
PROT UR-MCNC: SIGNIFICANT CHANGE UP MG/DL
RBC # BLD: 4.54 M/UL — SIGNIFICANT CHANGE UP (ref 3.8–5.2)
RBC # FLD: 12.5 % — SIGNIFICANT CHANGE UP (ref 10.3–14.5)
RBC CASTS # UR COMP ASSIST: 1 /HPF — SIGNIFICANT CHANGE UP (ref 0–4)
SODIUM SERPL-SCNC: 139 MMOL/L — SIGNIFICANT CHANGE UP (ref 135–145)
SP GR SPEC: >1.03 — HIGH (ref 1–1.03)
UROBILINOGEN FLD QL: 1 MG/DL — SIGNIFICANT CHANGE UP (ref 0.2–1)
WBC # BLD: 8.77 K/UL — SIGNIFICANT CHANGE UP (ref 3.8–10.5)
WBC # FLD AUTO: 8.77 K/UL — SIGNIFICANT CHANGE UP (ref 3.8–10.5)
WBC UR QL: 1 /HPF — SIGNIFICANT CHANGE UP (ref 0–5)

## 2024-08-19 PROCEDURE — 76830 TRANSVAGINAL US NON-OB: CPT | Mod: 26

## 2024-08-19 PROCEDURE — 93010 ELECTROCARDIOGRAM REPORT: CPT

## 2024-08-19 PROCEDURE — 74177 CT ABD & PELVIS W/CONTRAST: CPT | Mod: 26,MC

## 2024-08-19 PROCEDURE — 99053 MED SERV 10PM-8AM 24 HR FAC: CPT

## 2024-08-19 PROCEDURE — 99285 EMERGENCY DEPT VISIT HI MDM: CPT

## 2024-08-19 RX ORDER — IOHEXOL 240 MG/ML
30 INJECTION, SOLUTION INTRATHECAL; INTRAVASCULAR; INTRAVENOUS; ORAL ONCE
Refills: 0 | Status: COMPLETED | OUTPATIENT
Start: 2024-08-19 | End: 2024-08-19

## 2024-08-19 RX ORDER — ACETAMINOPHEN 500 MG
2 TABLET ORAL
Qty: 40 | Refills: 0
Start: 2024-08-19 | End: 2024-08-23

## 2024-08-19 RX ORDER — ACETAMINOPHEN 500 MG
975 TABLET ORAL ONCE
Refills: 0 | Status: COMPLETED | OUTPATIENT
Start: 2024-08-19 | End: 2024-08-19

## 2024-08-19 RX ORDER — METHOCARBAMOL 500 MG
1 TABLET ORAL
Qty: 20 | Refills: 0
Start: 2024-08-19 | End: 2024-08-23

## 2024-08-19 RX ADMIN — Medication 975 MILLIGRAM(S): at 02:17

## 2024-08-19 RX ADMIN — Medication 975 MILLIGRAM(S): at 02:47

## 2024-08-19 RX ADMIN — IOHEXOL 30 MILLILITER(S): 240 INJECTION, SOLUTION INTRATHECAL; INTRAVASCULAR; INTRAVENOUS; ORAL at 02:17

## 2024-08-19 NOTE — ED ADULT NURSE NOTE - CHPI ED NUR SYMPTOMS NEG
no acting out behaviors/no back pain/no decreased eating/drinking/no difficulty bearing weight/no disorientation/no dizziness/no fussiness/no headache/no laceration/no loss of consciousness/no sleeping issues
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
## 2024-08-19 NOTE — ED PROVIDER NOTE - CARE PROVIDER_API CALL
Anika Silvestre  Obstetrics and Gynecology  [address] [place] [address]  Phone: [phone]  Fax: [phone]  Follow Up Time: Urgent   Anika Silvestre  Obstetrics and Gynecology  [address] [place] [address]  Phone: [phone]  Fax: [phone]  Follow Up Time: Urgent    Jerome Moralez  Internal Medicine  [address] [place] [address]  Phone: [phone]  Fax: [phone]  Follow Up Time: Urgent
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
## 2024-08-19 NOTE — ED PROVIDER NOTE - PROVIDER TOKENS
PROVIDER:[TOKEN:[[id number]],FOLLOWUP:[Urgent]] PROVIDER:[TOKEN:[[id number]],FOLLOWUP:[Urgent]],PROVIDER:[TOKEN:[[id number]],FOLLOWUP:[Urgent]]

## 2024-08-19 NOTE — ED PROVIDER NOTE - PATIENT PORTAL LINK FT
You can access the FollowMyHealth Patient Portal offered by [facility] by registering at the following website: [email or web address]. By joining [facility]’s FollowMyHealth portal, you will also be able to view your health information using other applications (apps) compatible with our system.

## 2024-08-19 NOTE — ED PROVIDER NOTE - CARE PROVIDERS DIRECT ADDRESSES
,DirectAddress_Unknown ,DirectAddress_Unknown,[email or web address]
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
## 2024-08-19 NOTE — ED PROVIDER NOTE - PROGRESS NOTE DETAILS
Results reported to patient--grossly benign, labs and imaging unremarkable, no evidence of acute trauma   Pt. reports feeling better after meds  pt. agrees to f/u with primary care outpt., ob/gyn given for f/u of pelvic pain   pt. understands to return to ED if symptoms worsen; will d/c with results for reference, tylenol/robaxin prn musculoskeletal pain

## 2024-08-20 LAB
CULTURE RESULTS: ABNORMAL
SPECIMEN SOURCE: SIGNIFICANT CHANGE UP

## 2025-07-14 NOTE — ED ADULT NURSE NOTE - DISCHARGE DATE/TIME
Patient seen and examined.  No interval change since the below obtained H&P  Informed consent verified    NPO since midnight  Prep completed - Sutab  No anticoagulation  All questions and concerns addressed  To Endo for diag colonoscopy for pos cologuard    Donita Ferrer MD  General Surgery   [phone]     19-Aug-2024 05:23